# Patient Record
Sex: MALE | HISPANIC OR LATINO | URBAN - METROPOLITAN AREA
[De-identification: names, ages, dates, MRNs, and addresses within clinical notes are randomized per-mention and may not be internally consistent; named-entity substitution may affect disease eponyms.]

---

## 2024-07-27 ENCOUNTER — HOSPITAL ENCOUNTER (EMERGENCY)
Facility: MEDICAL CENTER | Age: 49
End: 2024-07-27
Attending: EMERGENCY MEDICINE
Payer: COMMERCIAL

## 2024-07-27 ENCOUNTER — APPOINTMENT (OUTPATIENT)
Dept: RADIOLOGY | Facility: MEDICAL CENTER | Age: 49
End: 2024-07-27
Attending: EMERGENCY MEDICINE
Payer: COMMERCIAL

## 2024-07-27 VITALS
SYSTOLIC BLOOD PRESSURE: 120 MMHG | TEMPERATURE: 98.4 F | RESPIRATION RATE: 16 BRPM | WEIGHT: 150.57 LBS | HEART RATE: 60 BPM | HEIGHT: 63 IN | OXYGEN SATURATION: 94 % | BODY MASS INDEX: 26.68 KG/M2 | DIASTOLIC BLOOD PRESSURE: 81 MMHG

## 2024-07-27 DIAGNOSIS — L03.116 CELLULITIS OF LEFT LOWER EXTREMITY: ICD-10-CM

## 2024-07-27 LAB — GLUCOSE BLD STRIP.AUTO-MCNC: 109 MG/DL (ref 65–99)

## 2024-07-27 PROCEDURE — 82962 GLUCOSE BLOOD TEST: CPT

## 2024-07-27 PROCEDURE — 73620 X-RAY EXAM OF FOOT: CPT | Mod: LT

## 2024-07-27 PROCEDURE — A9270 NON-COVERED ITEM OR SERVICE: HCPCS | Performed by: EMERGENCY MEDICINE

## 2024-07-27 PROCEDURE — 99283 EMERGENCY DEPT VISIT LOW MDM: CPT

## 2024-07-27 PROCEDURE — 700102 HCHG RX REV CODE 250 W/ 637 OVERRIDE(OP): Performed by: EMERGENCY MEDICINE

## 2024-07-27 RX ADMIN — AMOXICILLIN AND CLAVULANATE POTASSIUM 1 TABLET: 875; 125 TABLET, FILM COATED ORAL at 18:08

## 2024-07-27 NOTE — ED TRIAGE NOTES
Pt presents from home with wife left foot swelling and redness that began on Thursday and gradually worsening. No fever. Pain is worse with ambulation to plantar foot and cracked skin is noted. No recent use of antibiotics. Denies trauma. Denies diabetes. Dorsal pedal pulse 2+. Pt A&Ox4 and ambulatory.

## 2024-07-28 NOTE — ED PROVIDER NOTES
"ED Provider Note    CHIEF COMPLAINT  Chief Complaint   Patient presents with    Foot Swelling       EXTERNAL RECORDS REVIEWED  Inpatient Notes remote inpatient note from 2005 when the patient was seen status post trauma with a subdural hematoma.     HPI/ROS  LIMITATION TO HISTORY   Select: Language Grenadian,  Used   OUTSIDE HISTORIAN(S):  Significant other wife provides translation and history    Silvino Mac is a 49 y.o. male who presents to the emergency department with isolated left foot swelling.  Past medical history is largely benign.  No local PCP and noted routine screening.  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  Over the last 2 days he has noticed some increasing swelling to his left foot.  Also notes a small dermal lesion to his shin.  The exact etiology of this is unknown.  No known allergies.  No known bites or stings.  No atypical plant or other environmental exposures to his awareness.  Systemically otherwise feels well.  No leg or thigh discomfort.  No recent travel or trauma.    PAST MEDICAL HISTORY   has a past medical history of Brain bleed (HCC).    SURGICAL HISTORY  patient denies any surgical history    FAMILY HISTORY  History reviewed. No pertinent family history.    SOCIAL HISTORY  Social History     Tobacco Use    Smoking status: Former     Types: Cigarettes    Smokeless tobacco: Never   Vaping Use    Vaping status: Never Used   Substance and Sexual Activity    Alcohol use: Not Currently    Drug use: Never    Sexual activity: Not on file       CURRENT MEDICATIONS  Home Medications    **Home medications have not yet been reviewed for this encounter**         ALLERGIES  No Known Allergies    PHYSICAL EXAM  VITAL SIGNS: /81   Pulse 60   Temp 36.9 °C (98.4 °F) (Temporal)   Resp 16   Ht 1.6 m (5' 3\")   Wt 68.3 kg (150 lb 9.2 oz)   SpO2 94%   BMI 26.67 kg/m²        Pulse ox interpretation: I interpret this pulse ox as " normal.  Constitutional: Alert in no apparent distress.  HENT: No signs of trauma, Bilateral external ears normal, Nose normal.   Eyes: Pupils are equal and reactive  Neck: Normal range of motion, No tenderness, Supple  Cardiovascular: Regular rate and rhythm, no murmurs.   Thorax & Lungs: Normal breath sounds, No respiratory distress  Skin: Warm, Dry    Extremities: LLE: Slight diffuse soft tissue swelling to the left foot.  No involvement superior to the ankle.  No significant increased warmth.  He does have a small blister to the mid forefoot and also some slight dry and cracked skin to the sole of the foot.  He also has a small dermal lesion with small vesicles to the distal leg.        Musculoskeletal: Good range of motion in all major joints. No tenderness to palpation or major deformities noted.   Neurologic: Alert , Normal motor function, Normal sensory function, No focal deficits noted.   Psychiatric: Affect normal, Judgment normal, Mood normal.         EKG/LABS  Results for orders placed or performed during the hospital encounter of 07/27/24   POCT glucose device results   Result Value Ref Range    POC Glucose, Blood 109 (H) 65 - 99 mg/dL         RADIOLOGY/PROCEDURES   I have independently interpreted the diagnostic imaging associated with this visit and am waiting the final reading from the radiologist.   My preliminary interpretation is as follows: No fracture or significant soft tissue air    Radiologist interpretation:  DX-FOOT-2- LEFT   Final Result      No fracture or dislocation of LEFT foot.          COURSE & MEDICAL DECISION MAKING    ASSESSMENT, COURSE AND PLAN  Care Narrative: 49-year-old male presenting with above presentation.  Will complete fingerstick glucose and imaging to start.  Will treat empirically with antibiotics for cellulitis.    DISPOSITION AND DISCUSSIONS  I have discussed management of the patient with the following physicians and JAS's: None     Discussion of management with  other [facility] or appropriate source(s): Pharmacy for medication verification      Escalation of care considered, and ultimately not performed:acute inpatient care management, however at this time, the patient is most appropriate for outpatient management    Barriers to care at this time, including but not limited to: Patient does not have established PCP.     Decision tools and prescription drugs considered including, but not limited to: Antibiotics empirically started .    49-year-old male presented to the ER with above presentation.  Fingerstick as above.  X-ray imaging negative.  Will start empiric antibiotics for cellulitis.  Patient is an ongoing home wound care.  Will return here to the ER with any change or worsening.  He has been referred to local PCP to establish outpatient care and follow-up and routine medical screening.        FINAL DIAGNOSIS  1. Cellulitis of left lower extremity           Electronically signed by: Kit Khan M.D., 7/27/2024 5:47 PM

## 2025-05-16 ENCOUNTER — APPOINTMENT (OUTPATIENT)
Dept: RADIOLOGY | Facility: MEDICAL CENTER | Age: 50
End: 2025-05-16
Attending: EMERGENCY MEDICINE
Payer: COMMERCIAL

## 2025-05-16 ENCOUNTER — HOSPITAL ENCOUNTER (EMERGENCY)
Facility: MEDICAL CENTER | Age: 50
End: 2025-05-16
Attending: EMERGENCY MEDICINE
Payer: COMMERCIAL

## 2025-05-16 VITALS
DIASTOLIC BLOOD PRESSURE: 70 MMHG | SYSTOLIC BLOOD PRESSURE: 122 MMHG | TEMPERATURE: 97.8 F | BODY MASS INDEX: 28.47 KG/M2 | RESPIRATION RATE: 18 BRPM | HEART RATE: 71 BPM | OXYGEN SATURATION: 98 % | WEIGHT: 160.72 LBS

## 2025-05-16 DIAGNOSIS — S61.412A LACERATION OF LEFT HAND WITHOUT FOREIGN BODY, INITIAL ENCOUNTER: ICD-10-CM
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
## 2025-05-16 DIAGNOSIS — S61.452A DOG BITE OF LEFT HAND, INITIAL ENCOUNTER: Primary | ICD-10-CM

## 2025-05-16 PROCEDURE — 304217 HCHG IRRIGATION SYSTEM

## 2025-05-16 PROCEDURE — 73130 X-RAY EXAM OF HAND: CPT | Mod: LT

## 2025-05-16 PROCEDURE — 700102 HCHG RX REV CODE 250 W/ 637 OVERRIDE(OP): Performed by: EMERGENCY MEDICINE

## 2025-05-16 PROCEDURE — 90471 IMMUNIZATION ADMIN: CPT

## 2025-05-16 PROCEDURE — 99283 EMERGENCY DEPT VISIT LOW MDM: CPT

## 2025-05-16 PROCEDURE — A9270 NON-COVERED ITEM OR SERVICE: HCPCS | Performed by: EMERGENCY MEDICINE

## 2025-05-16 PROCEDURE — 700111 HCHG RX REV CODE 636 W/ 250 OVERRIDE (IP): Performed by: EMERGENCY MEDICINE

## 2025-05-16 PROCEDURE — 90715 TDAP VACCINE 7 YRS/> IM: CPT | Performed by: EMERGENCY MEDICINE

## 2025-05-16 RX ADMIN — AMOXICILLIN AND CLAVULANATE POTASSIUM 1 TABLET: 875; 125 TABLET, FILM COATED ORAL at 20:53

## 2025-05-16 RX ADMIN — CLOSTRIDIUM TETANI TOXOID ANTIGEN (FORMALDEHYDE INACTIVATED), CORYNEBACTERIUM DIPHTHERIAE TOXOID ANTIGEN (FORMALDEHYDE INACTIVATED), BORDETELLA PERTUSSIS TOXOID ANTIGEN (GLUTARALDEHYDE INACTIVATED), BORDETELLA PERTUSSIS FILAMENTOUS HEMAGGLUTININ ANTIGEN (FORMALDEHYDE INACTIVATED), BORDETELLA PERTUSSIS PERTACTIN ANTIGEN, AND BORDETELLA PERTUSSIS FIMBRIAE 2/3 ANTIGEN 0.5 ML: 5; 2; 2.5; 5; 3; 5 INJECTION, SUSPENSION INTRAMUSCULAR at 20:54

## 2025-05-17 NOTE — ED PROVIDER NOTES
ED Provider Note    CHIEF COMPLAINT  Chief Complaint   Patient presents with    Dog Bite     Pt was at work and a neighbor dog bite the pt on the left hand. Per owner of dog, dog is up to date on immunization. Pt was bit on middle finger and one bit on top of hand. Bleeding is under control in triage.         HPI    Primary care provider: Pcp Pt States None   History obtained from: Patient, wife and   History limited by: None     Silvino Mac is a 49 y.o. male who presents to the ED with wife complaining of dog bite to the left hand that occurred around 650 this evening.  Patient was bitten by neighbor's dog and neighbor reports the dog's shots are up-to-date.  Patient reports his last tetanus shot was approximately 7 years ago.  He denies any other injuries.  He is able to move his fingers and hand but reports some pain.  Patient is right-hand dominant.    REVIEW OF SYSTEMS  Please see HPI for pertinent positives/negatives.  All other systems reviewed and are negative.     PAST MEDICAL HISTORY  Past Medical History:   Diagnosis Date    Brain bleed (HCC)         SURGICAL HISTORY  Past Surgical History[1]     SOCIAL HISTORY  Social History     Tobacco Use    Smoking status: Former     Types: Cigarettes    Smokeless tobacco: Never   Vaping Use    Vaping status: Never Used   Substance and Sexual Activity    Alcohol use: Not Currently    Drug use: Never    Sexual activity: Not on file        FAMILY HISTORY  No family history on file.     CURRENT MEDICATIONS  Home Medications       Reviewed by Paul Dillard R.N. (Registered Nurse) on 05/16/25 at 1919  Med List Status: Complete     Medication Last Dose Status        Patient Ponce Taking any Medications                            ALLERGIES  Allergies[2]     PHYSICAL EXAM  VITAL SIGNS: /70   Pulse 71   Temp 36.6 °C (97.8 °F) (Temporal)   Resp 18   Wt 72.9 kg (160 lb 11.5 oz)   SpO2 98%    BMI 28.47 kg/m²  @PERLA[[id number]::@     Pulse ox interpretation: 96% I interpret this pulse ox as normal     Constitutional: Well developed, well nourished, alert in no apparent distress, nontoxic appearance    HENT: No external signs of trauma, normocephalic    Eyes: No discharge, no icterus     Neck: No stridor    Cardiovascular: Strong distal pulses and good perfusion    Thorax & Lungs: No respiratory distress    Extremities: No cyanosis, 2 wounds noted on dorsum of left hand and also wound on distal phalanx of middle finger with mild swelling and tenderness to palpation, no gross deformity, good ROM, intact distal pulses with brisk cap refill    Skin: Warm, dry, no pallor/cyanosis  Neuro: A/O times 3, no focal deficits noted    Psychiatric: Cooperative      DIAGNOSTIC STUDIES / PROCEDURES        LABS  All labs reviewed by me.     Results for orders placed or performed during the hospital encounter of 07/27/24   POCT glucose device results    Collection Time: 07/27/24  5:48 PM   Result Value Ref Range    POC Glucose, Blood 109 (H) 65 - 99 mg/dL        RADIOLOGY  I have independently interpreted the diagnostic imaging associated with this visit and am waiting the final reading from the radiologist.   My preliminary interpretation is as follows: No displaced fracture on the hand x-ray.    DX-HAND 3+ LEFT   Final Result      Soft tissue gas in the dorsal hand. No radiopaque foreign body. No acute fracture or dislocation.             COURSE & MEDICAL DECISION MAKING  Nursing notes, VS, PMSFHx reviewed in chart.     Review of past medical records shows the patient had outpatient visit yesterday regarding overweight, cellulitis of right leg and swelling of right foot.  Patient was last seen in this ED July 27, 2024 with diagnosis of left lower extremity cellulitis.  Patient was admitted at [facility] September 4, 2005 for left frontal subdural hematoma and discharged on October 28,  2005.      Differential diagnoses considered include but are not limited to: Dog bite, abrasion/laceration, Fx, contusion, tendon injury, neurovascular injury       ED Observation Status? No; Patient does not meet criteria for ED Observation.       Discussion of management with other [facility] or appropriate source(s): None     Escalation of care considered, and ultimately not performed: acute inpatient care management, however at this time, the patient is most appropriate for outpatient management.     Decision tools and prescription drugs considered including, but not limited to: Antibiotics   and Pain Medications  .        History and physical exam as above.  This is a 49-year-old male patient with previous traumatic subdural hematoma who presents to the ED with above complaints.  X-rays of the left hand without evidence for acute bony injury.  I discussed the findings with patient and wife.  Patient with several wounds on his left hand.  No evidence for significant neurovascular or tendon injury.  Wounds were cleaned and dressed in the ED.  He will be started on Augmentin.  A tetanus booster was given.  He was advised on good wound care, outpatient follow-up and given return to ED precautions.  Patient and wife verbalized understanding and agreed with plan of care with no further questions or concerns.      The patient is referred to a primary physician for blood pressure management, diabetic screening, and for all other preventative health concerns.       FINAL IMPRESSION  1. Dog bite of left hand, initial encounter Acute   2. Laceration of left hand without foreign body, initial encounter Acute          DISPOSITION  Patient will be discharged home in stable condition.       FOLLOW UP  Please follow-up with your doctor    Call in 3 days      [facility], Emergency Dept  [address]  [facility] [address] [phone]    If symptoms worsen         OUTPATIENT MEDICATIONS  Discharge  Medication List as of 5/16/2025  8:49 PM        START taking these medications    Details   amoxicillin-clavulanate (AUGMENTIN) 875-125 MG Tab Take 1 Tablet by mouth 2 times a day for 5 days., Disp-10 Tablet, R-0, Normal                Electronically signed by: Robert Joaquin D.O., 5/16/2025 7:54 PM      Portions of this record were made with voice recognition software.  Despite my review, errors may remain.  Please interpret this chart in the appropriate context.         [1] History reviewed. No pertinent surgical history.  [2] No Known Allergies

## 2025-05-17 NOTE — ED TRIAGE NOTES
Chief Complaint   Patient presents with    Dog Bite     Pt was at work and a neighbor dog bite the pt on the left hand. Per owner of dog, dog is up to date on immunization. Pt was bit on middle finger and one bit on top of hand. Bleeding is under control in triage.      /73   Pulse 69   Temp 36.6 °C (97.8 °F) (Temporal)   Resp 16   Wt 72.9 kg (160 lb 11.5 oz)   SpO2 96%   BMI 28.47 kg/m²